# Patient Record
Sex: MALE | Race: WHITE | URBAN - NONMETROPOLITAN AREA
[De-identification: names, ages, dates, MRNs, and addresses within clinical notes are randomized per-mention and may not be internally consistent; named-entity substitution may affect disease eponyms.]

---

## 2021-04-01 ENCOUNTER — IMMUNIZATION (OUTPATIENT)
Dept: PRIMARY CARE CLINIC | Age: 86
End: 2021-04-01
Payer: MEDICARE

## 2021-04-01 PROCEDURE — 0001A COVID-19, PFIZER VACCINE 30MCG/0.3ML DOSE: CPT | Performed by: FAMILY MEDICINE

## 2021-04-01 PROCEDURE — 91300 COVID-19, PFIZER VACCINE 30MCG/0.3ML DOSE: CPT | Performed by: FAMILY MEDICINE

## 2021-04-22 ENCOUNTER — IMMUNIZATION (OUTPATIENT)
Dept: PRIMARY CARE CLINIC | Age: 86
End: 2021-04-22
Payer: MEDICARE

## 2021-04-22 PROCEDURE — 0002A COVID-19, PFIZER VACCINE 30MCG/0.3ML DOSE: CPT | Performed by: PHARMACIST

## 2021-04-22 PROCEDURE — 91300 COVID-19, PFIZER VACCINE 30MCG/0.3ML DOSE: CPT | Performed by: PHARMACIST

## 2024-04-23 ENCOUNTER — HOSPITAL ENCOUNTER (OUTPATIENT)
Dept: PHYSICAL THERAPY | Age: 89
Setting detail: THERAPIES SERIES
Discharge: HOME | End: 2024-04-23
Payer: MEDICARE

## 2024-04-23 PROCEDURE — 97162 PT EVAL MOD COMPLEX 30 MIN: CPT

## 2024-04-23 NOTE — PROGRESS NOTES
** PLEASE SIGN, DATE AND TIME CERTIFICATION BELOW AND RETURN TO [facility] OUTPATIENT REHABILITATION (FAX #: [phone]).  ATTEST/CO-SIGN IF ACCESSING VIA [employer].  THANK YOU.**    I certify that I have examined the patient below and determined that Physical Medicine and Rehabilitation service is necessary and that I approve the established plan of care for up to 90 days or as specifically noted.  Attestation, signature or co-signature of physician indicates approval of certification requirements.    ________________________ ____________ __________  Physician Signature   Date   Time      [facility]  PHYSICAL THERAPY  [x] EVALUATION  [] DAILY NOTE (LAND) [] DAILY NOTE (AQUATIC ) [] PROGRESS NOTE [] DISCHARGE NOTE    [x] OUTPATIENT REHABILITATION [facility]   [] [facility]    [] [facility]   [] [facility]    Date: 2024  Patient Name:  Howie Paz  : 1932  MRN: [id number]  CSN: [id number]    Referring Practitioner Bull Garcia MD    Diagnosis Parkinsonism, unspecified    Treatment Diagnosis Parkinsonism; Deconditioning   Date of Evaluation 24    Additional Pertinent History Pacemaker.  Arthritis.  Kidney stones.  Takes COUMADIN blood thinner.        Functional Outcome Measure Used NEFF   Functional Outcome Score 31/56 (24)       Insurance: Primary: Payor: [facility] MEDICARE /  /  / ,   Secondary:    Authorization Information: PRE CERTIFICATION REQUIRED: No precert required.  INSURANCE THERAPY BENEFIT: UNLIMITED VISITS BASED ON MEDICAL NECESSITY. .   AQUATIC THERAPY COVERED: Yes  MODALITIES COVERED:  Yes  TELEHEALTH COVERED:    REFERENCE NUMBER: [id number]   Approved Procedure Codes: Authorization of Specific CPT Codes Not Required  (Codes requested indicated by red font, codes approved indicated by black font)   Visit # 1, 1/10 for progress note (Reporting Period: 24 to 24)   Visits Allowed: Unlimited per

## 2024-04-25 ENCOUNTER — HOSPITAL ENCOUNTER (OUTPATIENT)
Dept: PHYSICAL THERAPY | Age: 89
Setting detail: THERAPIES SERIES
Discharge: HOME | End: 2024-04-25
Payer: MEDICARE

## 2024-04-25 PROCEDURE — 97110 THERAPEUTIC EXERCISES: CPT

## 2024-04-25 NOTE — PROGRESS NOTES
HEP, and his son is available to help.  Next visit review the exercise.  Howie has some limitations but is able to step and count LOUD as his potential and was smiling during exercise especially the backwards step.           Body Structures/Functions/Activity Limitations: impaired balance, impaired coordination, impaired endurance, impaired ROM, impaired strength, abnormal gait, and abnormal posture  Prognosis: good    GOALS:  Patient Goal: \"I want to be more steady.\"    Short Term Goals:  Time Frame: 2 weeks  Patient to initiate a HEP per BIG exercise (at least 1 in seated and 1 standing).   Patient to initiate 5 Functional Component Tasks, do complete daily with family supervision.   Patient to initiate walking BIG at least 2x per day at home using a ROLLING WALKER or CANE (with SBA from family)      Long Term Goals:  Time Frame: 4 to 12 weeks  Patient to be independent with a HEP that will include balance, general strengthening, BIG motions per standing and sitting.  Patient will verbalize understanding of BIG and accepts BIG as a means for moving in a normalized manner.   Patient will use BIG motions % of the time in daily functional mobility and ADL's as reported by himself and family as well as observed in this PT clinic -- doing so with appropriate assistive device as this may allow the bigger motions with optimal safety.        Patient Education:   [x]  HEP/Education Completed: Plan of Care, Goals, patient practiced with 2 wheeled walker, and will bring his walker to next visit.  [employer] Access Code:  []  No new Education completed  []  Reviewed Prior HEP      []  Patient verbalized and/or demonstrated understanding of education provided.  []  Patient unable to verbalize and/or demonstrate understanding of education provided.  Will continue education.  [x]  Barriers to learning: some confusion with instructions or context of questions    PLAN:  Treatment Recommendations: Strengthening, Range of

## 2024-05-01 ENCOUNTER — HOSPITAL ENCOUNTER (OUTPATIENT)
Dept: PHYSICAL THERAPY | Age: 89
Setting detail: THERAPIES SERIES
Discharge: HOME | End: 2024-05-01
Payer: MEDICARE

## 2024-05-01 PROCEDURE — 97110 THERAPEUTIC EXERCISES: CPT

## 2024-05-01 NOTE — PROGRESS NOTES
[facility]  PHYSICAL THERAPY  [] EVALUATION  [x] DAILY NOTE (LAND) [] DAILY NOTE (AQUATIC ) [] PROGRESS NOTE [] DISCHARGE NOTE    [x] OUTPATIENT REHABILITATION CENTER [facility]   [] [place] [facility]    [] [facility]   [] [facility]    Date: 2024  Patient Name:  Howie Paz  : 1932  MRN: [id number]  CSN: [id number]    Referring Practitioner Bull Garcia MD    Diagnosis Parkinsonism, unspecified    Treatment Diagnosis Parkinsonism; Deconditioning   Date of Evaluation 24    Additional Pertinent History Pacemaker.  Arthritis.  Kidney stones.  Takes COUMADIN blood thinner.        Functional Outcome Measure Used NEFF   Functional Outcome Score  (24)       Insurance: Primary: Payor: [facility] MEDICARE /  /  / ,   Secondary:    Authorization Information: PRE CERTIFICATION REQUIRED: No precert required.  INSURANCE THERAPY BENEFIT: UNLIMITED VISITS BASED ON MEDICAL NECESSITY. .   AQUATIC THERAPY COVERED: Yes  MODALITIES COVERED:  Yes  TELEHEALTH COVERED:    REFERENCE NUMBER: [id number]   Approved Procedure Codes: Authorization of Specific CPT Codes Not Required  (Codes requested indicated by red font, codes approved indicated by black font)   Visit # 3, 3/10 for progress note (Reporting Period: 24 to 24)   Visits Allowed: Unlimited per medical necessity   Recertification Date: 2024   Physician Follow-Up: Per patient   Physician Orders:    History of Present Illness: Howie presents with his son (Howie II).  He is using a straight cane.  Denies falls in the past year.  States he takes pain medication and states this is helpful.  Denies near-falls in past year.  \"I have been wobbly a few times but I didn't fall.\"  States he has no metal in his body (e.g. from surgeries).  There is no medication list in the chart (or with patient today) and he will bring the list next time.  States he takes Parkinson medications.

## 2024-05-03 ENCOUNTER — HOSPITAL ENCOUNTER (OUTPATIENT)
Dept: PHYSICAL THERAPY | Age: 89
Setting detail: THERAPIES SERIES
Discharge: HOME | End: 2024-05-03
Payer: MEDICARE

## 2024-05-03 PROCEDURE — 97112 NEUROMUSCULAR REEDUCATION: CPT

## 2024-05-03 PROCEDURE — 97116 GAIT TRAINING THERAPY: CPT

## 2024-05-03 NOTE — PROGRESS NOTES
[facility]  PHYSICAL THERAPY  [] EVALUATION  [x] DAILY NOTE (LAND) [] DAILY NOTE (AQUATIC ) [] PROGRESS NOTE [] DISCHARGE NOTE    [x] OUTPATIENT REHABILITATION CENTER [facility]   [] [place] [facility]    [] [facility]   [] [facility]    Date: 5/3/2024  Patient Name:  Howie Paz  : 1932  MRN: [id number]  CSN: [id number]    Referring Practitioner Bull Garcia MD    Diagnosis Parkinsonism, unspecified    Treatment Diagnosis Parkinsonism; Deconditioning   Date of Evaluation 24    Additional Pertinent History Pacemaker.  Arthritis.  Kidney stones.  Takes COUMADIN blood thinner.        Functional Outcome Measure Used NEFF   Functional Outcome Score  (24)       Insurance: Primary: Payor: [facility] MEDICARE /  /  / ,   Secondary:    Authorization Information: PRE CERTIFICATION REQUIRED: No precert required.  INSURANCE THERAPY BENEFIT: UNLIMITED VISITS BASED ON MEDICAL NECESSITY. .   AQUATIC THERAPY COVERED: Yes  MODALITIES COVERED:  Yes  TELEHEALTH COVERED:    REFERENCE NUMBER: [id number]   Approved Procedure Codes: Authorization of Specific CPT Codes Not Required  (Codes requested indicated by red font, codes approved indicated by black font)   Visit # 4, 4/10 for progress note (Reporting Period: 24 to 24)   Visits Allowed: Unlimited per medical necessity   Recertification Date: 2024   Physician Follow-Up: Per patient   Physician Orders:    History of Present Illness: Howie presents with his son (Howie II).  He is using a straight cane.  Denies falls in the past year.  States he takes pain medication and states this is helpful.  Denies near-falls in past year.  \"I have been wobbly a few times but I didn't fall.\"  States he has no metal in his body (e.g. from surgeries).  There is no medication list in the chart (or with patient today) and he will bring the list next time.  States he takes Parkinson medications.

## 2024-05-07 ENCOUNTER — APPOINTMENT (OUTPATIENT)
Dept: PHYSICAL THERAPY | Age: 89
End: 2024-05-07
Payer: MEDICARE

## 2024-05-09 ENCOUNTER — HOSPITAL ENCOUNTER (OUTPATIENT)
Dept: PHYSICAL THERAPY | Age: 89
Setting detail: THERAPIES SERIES
Discharge: HOME | End: 2024-05-09
Payer: MEDICARE

## 2024-05-09 PROCEDURE — 97110 THERAPEUTIC EXERCISES: CPT

## 2024-05-09 NOTE — PROGRESS NOTES
[facility]  PHYSICAL THERAPY  [] EVALUATION  [x] DAILY NOTE (LAND) [] DAILY NOTE (AQUATIC ) [] PROGRESS NOTE [] DISCHARGE NOTE    [x] OUTPATIENT REHABILITATION CENTER [facility]   [] [place] [facility]    [] [facility]   [] [facility]    Date: 2024  Patient Name:  Howie Paz  : 1932  MRN: [id number]  CSN: [id number]    Referring Practitioner Bull Garcia MD    Diagnosis Parkinsonism, unspecified    Treatment Diagnosis Parkinsonism; Deconditioning   Date of Evaluation 24    Additional Pertinent History Pacemaker.  Arthritis.  Kidney stones.  Takes COUMADIN blood thinner.        Functional Outcome Measure Used NEFF   Functional Outcome Score  (24)       Insurance: Primary: Payor: [facility] MEDICARE /  /  / ,   Secondary:    Authorization Information: PRE CERTIFICATION REQUIRED: No precert required.  INSURANCE THERAPY BENEFIT: UNLIMITED VISITS BASED ON MEDICAL NECESSITY. .   AQUATIC THERAPY COVERED: Yes  MODALITIES COVERED:  Yes  TELEHEALTH COVERED:    REFERENCE NUMBER: [id number]   Approved Procedure Codes: Authorization of Specific CPT Codes Not Required  (Codes requested indicated by red font, codes approved indicated by black font)   Visit # 5, 10 for progress note (Reporting Period: 24 to 24)   Visits Allowed: Unlimited per medical necessity   Recertification Date: 2024   Physician Follow-Up: Per patient   Physician Orders:    History of Present Illness: Howie presents with his son (Howie II).  He is using a straight cane.  Denies falls in the past year.  States he takes pain medication and states this is helpful.  Denies near-falls in past year.  \"I have been wobbly a few times but I didn't fall.\"  States he has no metal in his body (e.g. from surgeries).  There is no medication list in the chart (or with patient today) and he will bring the list next time.  States he takes Parkinson medications.

## 2024-05-14 ENCOUNTER — HOSPITAL ENCOUNTER (OUTPATIENT)
Dept: PHYSICAL THERAPY | Age: 89
Setting detail: THERAPIES SERIES
Discharge: HOME | End: 2024-05-14
Payer: MEDICARE

## 2024-05-14 PROCEDURE — 97110 THERAPEUTIC EXERCISES: CPT

## 2024-05-14 PROCEDURE — 97112 NEUROMUSCULAR REEDUCATION: CPT

## 2024-05-14 NOTE — PROGRESS NOTES
[facility]  PHYSICAL THERAPY  [] EVALUATION  [x] DAILY NOTE (LAND) [] DAILY NOTE (AQUATIC ) [] PROGRESS NOTE [] DISCHARGE NOTE    [x] OUTPATIENT REHABILITATION CENTER [facility]   [] [place] [facility]    [] [facility]   [facility]    Date: 2024  Patient Name:  Howie Paz  : 1932  MRN: [id number]  CSN: [id number]    Referring Practitioner Bull Garcia MD    Diagnosis Parkinsonism, unspecified    Treatment Diagnosis Parkinsonism; Deconditioning   Date of Evaluation 24    Additional Pertinent History Pacemaker.  Arthritis.  Kidney stones.  Takes COUMADIN blood thinner.        Functional Outcome Measure Used NEFF   Functional Outcome Score  (24)       Insurance: Primary: Payor: [facility] MEDICARE /  /  / ,   Secondary:    Authorization Information: PRE CERTIFICATION REQUIRED: No precert required.  INSURANCE THERAPY BENEFIT: UNLIMITED VISITS BASED ON MEDICAL NECESSITY. .   AQUATIC THERAPY COVERED: Yes  MODALITIES COVERED:  Yes  TELEHEALTH COVERED:    REFERENCE NUMBER: [id number]   Approved Procedure Codes: Authorization of Specific CPT Codes Not Required  (Codes requested indicated by red font, codes approved indicated by black font)   Visit # 6, 6/10 for progress note (Reporting Period: 24 to 24)   Visits Allowed: Unlimited per medical necessity   Recertification Date: 2024   Physician Follow-Up: Per patient   Physician Orders:    History of Present Illness: Howie presents with his son (Howie II).  He is using a straight cane.  Denies falls in the past year.  States he takes pain medication and states this is helpful.  Denies near-falls in past year.  \"I have been wobbly a few times but I didn't fall.\"  States he has no metal in his body (e.g. from surgeries).  There is no medication list in the chart (or with patient today) and he will bring the list next time.  States he takes Parkinson medications.

## 2024-05-16 ENCOUNTER — HOSPITAL ENCOUNTER (OUTPATIENT)
Dept: PHYSICAL THERAPY | Age: 89
Setting detail: THERAPIES SERIES
Discharge: HOME | End: 2024-05-16
Payer: MEDICARE

## 2024-05-16 PROCEDURE — 97110 THERAPEUTIC EXERCISES: CPT

## 2024-05-16 NOTE — PROGRESS NOTES
[facility]  PHYSICAL THERAPY  [] EVALUATION  [x] DAILY NOTE (LAND) [] DAILY NOTE (AQUATIC ) [] PROGRESS NOTE [] DISCHARGE NOTE    [x] OUTPATIENT REHABILITATION CENTER [facility]   [] [place] [facility]    [] [facility]   [facility]    Date: 2024  Patient Name:  Howie Paz  : 1932  MRN: [id number]  CSN: [id number]    Referring Practitioner Bull Garcia MD    Diagnosis Parkinsonism, unspecified    Treatment Diagnosis Parkinsonism; Deconditioning   Date of Evaluation 24    Additional Pertinent History Pacemaker.  Arthritis.  Kidney stones.  Takes COUMADIN blood thinner.        Functional Outcome Measure Used NEFF   Functional Outcome Score  (24)       Insurance: Primary: Payor: [facility] MEDICARE /  /  / ,   Secondary:    Authorization Information: PRE CERTIFICATION REQUIRED: No precert required.  INSURANCE THERAPY BENEFIT: UNLIMITED VISITS BASED ON MEDICAL NECESSITY. .   AQUATIC THERAPY COVERED: Yes  MODALITIES COVERED:  Yes  TELEHEALTH COVERED:    REFERENCE NUMBER: [id number]   Approved Procedure Codes: Authorization of Specific CPT Codes Not Required  (Codes requested indicated by red font, codes approved indicated by black font)   Visit # 7, 7/10 for progress note (Reporting Period: 24 to 24)   Visits Allowed: Unlimited per medical necessity   Recertification Date: 2024   Physician Follow-Up: Per patient   Physician Orders:    History of Present Illness: Howie presents with his son (Howie II).  He is using a straight cane.  Denies falls in the past year.  States he takes pain medication and states this is helpful.  Denies near-falls in past year.  \"I have been wobbly a few times but I didn't fall.\"  States he has no metal in his body (e.g. from surgeries).  There is no medication list in the chart (or with patient today) and he will bring the list next time.  States he takes Parkinson medications.

## 2024-05-21 ENCOUNTER — APPOINTMENT (OUTPATIENT)
Dept: PHYSICAL THERAPY | Age: 89
End: 2024-05-21
Payer: MEDICARE

## 2024-05-23 ENCOUNTER — APPOINTMENT (OUTPATIENT)
Dept: PHYSICAL THERAPY | Age: 89
End: 2024-05-23
Payer: MEDICARE